# Patient Record
Sex: FEMALE | Employment: FULL TIME
[De-identification: names, ages, dates, MRNs, and addresses within clinical notes are randomized per-mention and may not be internally consistent; named-entity substitution may affect disease eponyms.]

---

## 2022-10-13 ENCOUNTER — OFFICE VISIT (OUTPATIENT)
Dept: FAMILY MEDICINE CLINIC | Facility: CLINIC | Age: 22
End: 2022-10-13
Payer: MEDICAID

## 2022-10-13 VITALS
TEMPERATURE: 98.1 F | RESPIRATION RATE: 14 BRPM | DIASTOLIC BLOOD PRESSURE: 82 MMHG | HEART RATE: 71 BPM | OXYGEN SATURATION: 100 % | BODY MASS INDEX: 17.29 KG/M2 | SYSTOLIC BLOOD PRESSURE: 140 MMHG | HEIGHT: 66 IN | WEIGHT: 107.6 LBS

## 2022-10-13 DIAGNOSIS — Z87.440 HISTORY OF RECURRENT UTIS: ICD-10-CM

## 2022-10-13 DIAGNOSIS — Z11.3 SCREENING EXAMINATION FOR STD (SEXUALLY TRANSMITTED DISEASE): ICD-10-CM

## 2022-10-13 DIAGNOSIS — R35.0 FREQUENCY OF URINATION: Primary | ICD-10-CM

## 2022-10-13 LAB
BILIRUBIN, URINE, POC: NEGATIVE
BLOOD URINE, POC: NEGATIVE
GLUCOSE URINE, POC: NEGATIVE
KETONES, URINE, POC: NORMAL
LEUKOCYTE ESTERASE, URINE, POC: NORMAL
NITRITE, URINE, POC: NEGATIVE
PH, URINE, POC: 6 (ref 4.6–8)
PROTEIN,URINE, POC: NEGATIVE
SPECIFIC GRAVITY, URINE, POC: 1.02 (ref 1–1.03)
URINALYSIS CLARITY, POC: NORMAL
URINALYSIS COLOR, POC: NORMAL
UROBILINOGEN, POC: 0.2

## 2022-10-13 PROCEDURE — 81003 URINALYSIS AUTO W/O SCOPE: CPT | Performed by: PHYSICIAN ASSISTANT

## 2022-10-13 PROCEDURE — 99204 OFFICE O/P NEW MOD 45 MIN: CPT | Performed by: PHYSICIAN ASSISTANT

## 2022-10-13 RX ORDER — LORATADINE 10 MG/1
10 TABLET ORAL DAILY
COMMUNITY
Start: 2019-09-04

## 2022-10-13 ASSESSMENT — ENCOUNTER SYMPTOMS
EYE DISCHARGE: 0
EYE PAIN: 0
CONSTIPATION: 0
RHINORRHEA: 0
COUGH: 0
DIARRHEA: 0
SHORTNESS OF BREATH: 0

## 2022-10-13 ASSESSMENT — PATIENT HEALTH QUESTIONNAIRE - PHQ9
2. FEELING DOWN, DEPRESSED OR HOPELESS: 0
SUM OF ALL RESPONSES TO PHQ QUESTIONS 1-9: 0
SUM OF ALL RESPONSES TO PHQ9 QUESTIONS 1 & 2: 0
1. LITTLE INTEREST OR PLEASURE IN DOING THINGS: 0

## 2022-10-13 NOTE — PROGRESS NOTES
NEW PATIENT ESTABLISH PRIMARY CARE PROVIDER VISIT     Ronda Garza is a 23 y.o. female new patient who presents to establish PCP care     PMH   has no past medical history on file. No past surgical history on file. ALLERGIES    NKA    HOSPITALIZATIONS    None    PREVIOUS PRIMARY CARE PROVIDER    [facility]    Provided by patient: none available       SPECIALISTS    OB/GYN--[place]    HPI:  States that she's had symptoms of UTI off/on for several months now. Previously went to  about 2 weeks ago and about a year ago. Has been drinking cranberry juice to help w/ symptoms. Review of Systems:  Review of Systems   Constitutional:  Negative for chills and fever. HENT:  Negative for congestion, hearing loss, rhinorrhea and tinnitus. Eyes:  Negative for pain and discharge. Respiratory:  Negative for cough and shortness of breath. Cardiovascular:  Negative for chest pain. Gastrointestinal:  Negative for constipation and diarrhea. Genitourinary:  Positive for dysuria. Negative for difficulty urinating, pelvic pain and urgency. Recurrent UTI's x ~1 year   Musculoskeletal:  Negative for arthralgias. Neurological:  Negative for dizziness. Psychiatric/Behavioral:  Negative for agitation. The patient is not nervous/anxious. Vitals:    10/13/22 1453   BP: (!) 140/82   Pulse: 71   Resp: 14   Temp: 98.1 °F (36.7 °C)   TempSrc: Oral   SpO2: 100%   Weight: 107 lb 9.6 oz (48.8 kg)   Height: 5' 5.5\" (1.664 m)        PHYSICAL EXAM:   Physical Exam  Constitutional:       Appearance: Normal appearance. She is normal weight. HENT:      Head: Normocephalic and atraumatic. Eyes:      Pupils: Pupils are equal, round, and reactive to light. Cardiovascular:      Rate and Rhythm: Normal rate and regular rhythm. Heart sounds: Normal heart sounds. Pulmonary:      Effort: Pulmonary effort is normal.      Breath sounds: Normal breath sounds.    Genitourinary: General: Normal vulva. Vagina: Normal.      Cervix: Cervical bleeding (very light) present. No cervical motion tenderness, discharge, friability or erythema. Uterus: Not tender. Adnexa: Right adnexa normal and left adnexa normal.        Right: No tenderness. Left: No tenderness. Skin:     General: Skin is warm and dry. Neurological:      Mental Status: She is alert. PHQ-9  10/13/2022   Little interest or pleasure in doing things 0   Feeling down, depressed, or hopeless 0   PHQ-2 Score 0   PHQ-9 Total Score 0        DIAGNOSIS  Alonso Manning was seen today for new patient. Diagnoses and all orders for this visit:    Frequency of urination  -     AMB POC URINALYSIS DIP STICK AUTO W/O MICRO  -     Culture, Urine; Future    History of recurrent UTIs  -     Nuswab Vaginitis Plus (VG+); Future    Screening examination for STD (sexually transmitted disease)  -     HIV 1/2 Ag/Ab, 4TH Generation,W Rflx Confirm; Future  -     RPR; Future       Assessment & Plan  Pt was encouraged to sign up/go to [employer] for access to information at a later time. Reviewed lab results from recent  visit and C&S performed on 9/29/22 was negative for growth. She did appear to have a UTI when UA was done in March 2021 & again in 10/2021. UA today shows:   Results for orders placed or performed in visit on 10/13/22   AMB POC URINALYSIS DIP STICK AUTO W/O MICRO   Result Value Ref Range    Color, Urine, POC dark yellow     Clarity, Urine, POC slt cloudy     Glucose, Urine, POC Negative Negative    Bilirubin, Urine, POC Negative Negative    Ketones, Urine, POC Trace Negative    Specific Gravity, Urine, POC 1.020 1.001 - 1.035    Blood, Urine, POC negative Negative    pH, Urine, POC 6.0 4.6 - 8.0    Protein, Urine, POC Negative Negative    Urobilinogen, POC 0.2     Nitrate, Urine, POC negative Negative    Leukocyte Esterase, Urine, POC Small Negative   --will send for C&S and treat if growth occurs. Collected swab for Vaginitis + Testing and will send to lab for HIV and RPR screening. Follow Up    Please send to lab today for bloodwork  F/u as needed      Orders Placed This Encounter   Procedures    Nuswab Vaginitis Plus (VG+)     Standing Status:   Future     Standing Expiration Date:   10/13/2023    Culture, Urine     Standing Status:   Future     Standing Expiration Date:   10/13/2023     Order Specific Question:   Specify (ex-cath, midstream, cysto, etc)?      Answer:   midstream    HIV 1/2 Ag/Ab, 4TH Generation,W Rflx Confirm     Standing Status:   Future     Standing Expiration Date:   1/13/2023    RPR     Standing Status:   Future     Standing Expiration Date:   1/13/2023    AMB POC URINALYSIS DIP STICK AUTO W/[place] [facility] [place] [address] [address]  Dept: [phone]  Dept Fax: [phone]

## 2022-10-13 NOTE — PATIENT INSTRUCTIONS
Thank you for enrolling in [address] Please follow the instructions below to securely access your online medical record. [employer] allows you to send messages to your doctor, view your test results, renew your prescriptions, schedule appointments, and more. How Do I Sign Up? In your Internet browser, go to [email or web address]  Click on the Sign Up Now link in the Sign In box. You will see the New Member Sign Up page. Enter your [employer] Access Code exactly as it appears below. You will not need to use this code after youve completed the sign-up process. If you do not sign up before the expiration date, you must request a new code. [employer] Access Code: [email or web address]  Expires: 11/27/2022  3:19 PM    Enter your Social Security Number ([id number]) and Date of Birth (mm/dd/yyyy) as indicated and click Submit. You will be taken to the next sign-up page. Create a [employer] ID. This will be your [employer] login ID and cannot be changed, so think of one that is secure and easy to remember. Create a [employer] password. You can change your password at any time. Enter your Password Reset Question and Answer. This can be used at a later time if you forget your password. Enter your e-mail address. You will receive e-mail notification when new information is available in [address] Click Sign Up. You can now view your medical record. Additional Information  If you have questions, please contact your physician practice where you receive care. Remember, [employer] is NOT to be used for urgent needs. For medical emergencies, dial 911.

## 2022-10-14 LAB
HIV 1+2 AB+HIV1 P24 AG SERPL QL IA: NONREACTIVE
HIV 1/2 RESULT COMMENT: NORMAL
RPR SER QL: NONREACTIVE

## 2022-10-15 LAB
BACTERIA SPEC CULT: ABNORMAL
BACTERIA SPEC CULT: ABNORMAL
SERVICE CMNT-IMP: ABNORMAL

## 2022-10-16 LAB
A VAGINAE DNA VAG QL NAA+PROBE: NORMAL SCORE
BVAB2 DNA VAG QL NAA+PROBE: NORMAL SCORE
C ALBICANS DNA VAG QL NAA+PROBE: NEGATIVE
C GLABRATA DNA VAG QL NAA+PROBE: NEGATIVE
C TRACH RRNA SPEC QL NAA+PROBE: NEGATIVE
MEGA1 DNA VAG QL NAA+PROBE: NORMAL SCORE
N GONORRHOEA RRNA SPEC QL NAA+PROBE: NEGATIVE
SPECIMEN SOURCE: NORMAL
T VAGINALIS RRNA SPEC QL NAA+PROBE: NEGATIVE

## 2022-10-18 DIAGNOSIS — N30.00 ACUTE CYSTITIS WITHOUT HEMATURIA: Primary | ICD-10-CM

## 2022-10-18 RX ORDER — AMOXICILLIN 500 MG/1
500 CAPSULE ORAL 2 TIMES DAILY
Qty: 10 CAPSULE | Refills: 0 | Status: SHIPPED | OUTPATIENT
Start: 2022-10-18 | End: 2022-10-23